# Patient Record
(demographics unavailable — no encounter records)

---

## 2025-07-08 NOTE — PHYSICAL EXAM
[de-identified] : Constitutional:  72 year old female, alert and oriented, cooperative, in no acute distress.  HEENT  NC/AT.  Appearance: symmetric  Neck/Back Straight without deformity or instability.  Good ROM.  Chest/Respiratory  Respiratory effort: no intercostal retractions or use of accessory muscles. Nonlabored Breathing  Skin  On inspection, warm and dry without rashes or lesions.  Mental Status:  Judgment, insight: intact Orientation: oriented to time, place, and person  Neurological: Sensory and Motor are grossly intact throughout  Right Knee  Inspection:     Skin intact, no rashes or lesions     No Effusion     Non-tender to palpation over tibial tubercle, patella, medial and lateral joint line, and pes insertion.  Range of Motion: 	Extension - 0 degrees 	Flexion - 120 degrees 	Extensor lag: None  Stability:      Demonstrates no Varus or Valgus instability      Negative Anterior or Posterior drawer.      Negative Lachman's  Patella: stable, tracks well.   Neurologic Exam     Motor intact including 5/5 Extensor Hallucis Longus, 5/5 Flexor Hallucis Longus, 5/5 Tibialis Anterior and 5/5 Gastrocnemius     Sensation Intact to Light Touch including Saphenous, Sural, Superficial Peroneal, Deep Peroneal, Tibial nerve distributions  Vascular Exam     Foot is warm and well perfused with 2+ Dorsalis Pedis Pulse   No pain with range of motion of the bilateral hips or left knee. No lumbar paraspinal muscle tenderness.  [de-identified] : XRay: XRays of the Right Knee (4 Views) taken in the office today and reviewed with the patient. XRays demonstrate tricompartmental joint space narrowing, with bone on bone articulations in the medial compartment, with subchondral sclerosis, overlying osteophytes, all consistent with severe osteoarthritis, KL Grade: 4. (my personal interpretation)

## 2025-07-08 NOTE — PHYSICAL EXAM
[de-identified] : Constitutional:  72 year old female, alert and oriented, cooperative, in no acute distress.  HEENT  NC/AT.  Appearance: symmetric  Neck/Back Straight without deformity or instability.  Good ROM.  Chest/Respiratory  Respiratory effort: no intercostal retractions or use of accessory muscles. Nonlabored Breathing  Skin  On inspection, warm and dry without rashes or lesions.  Mental Status:  Judgment, insight: intact Orientation: oriented to time, place, and person  Neurological: Sensory and Motor are grossly intact throughout  Right Knee  Inspection:     Skin intact, no rashes or lesions     No Effusion     Non-tender to palpation over tibial tubercle, patella, medial and lateral joint line, and pes insertion.  Range of Motion: 	Extension - 0 degrees 	Flexion - 120 degrees 	Extensor lag: None  Stability:      Demonstrates no Varus or Valgus instability      Negative Anterior or Posterior drawer.      Negative Lachman's  Patella: stable, tracks well.   Neurologic Exam     Motor intact including 5/5 Extensor Hallucis Longus, 5/5 Flexor Hallucis Longus, 5/5 Tibialis Anterior and 5/5 Gastrocnemius     Sensation Intact to Light Touch including Saphenous, Sural, Superficial Peroneal, Deep Peroneal, Tibial nerve distributions  Vascular Exam     Foot is warm and well perfused with 2+ Dorsalis Pedis Pulse   No pain with range of motion of the bilateral hips or left knee. No lumbar paraspinal muscle tenderness.  [de-identified] : XRay: XRays of the Right Knee (4 Views) taken in the office today and reviewed with the patient. XRays demonstrate tricompartmental joint space narrowing, with bone on bone articulations in the medial compartment, with subchondral sclerosis, overlying osteophytes, all consistent with severe osteoarthritis, KL Grade: 4. (my personal interpretation)

## 2025-07-08 NOTE — DISCUSSION/SUMMARY
[de-identified] : Sandy Pearce is a 72-year-old female who presents to the office for evaluation of her right knee pain.  X-rays showed severe right knee osteoarthritis.  Examination showed good right knee range of motion. Discussed with patient the examination and imaging findings.  Discussed with patient the operative and nonoperative management of knee osteoarthritis, including total knee arthroplasty.  Discussed the nonoperative management of knee osteoarthritis, including physical therapy, anti-inflammatories, and injections.  Patient has tried nonoperative management, but continues to have knee pain.  Discussed total knee arthroplasty at length, including surgical procedure, hospital course, DVT prophylaxis, antibiotics, physical therapy, recovery, and risks. Patient would like to proceed with total knee arthroplasty.  Discussed that patient will require medical clearance prior to surgery.  Discussed obtaining a CT scan prior to surgery.  Patient understanding and in agreement with the plan.  All questions answered.   Discussed the imaging and physical exam findings with the patient consistent with endstage knee degenerative disease. The patient has failed conservative management including physical therapy, pain control and injections. The risks, benefits and alternatives to total knee replacement were discussed with the patient in detail and the patient elected to proceed with surgery. Discussed the surgical plan with the patient including implant options and surgical approach.   Surgical risks including fracture requiring fixation, instability or dislocation, temporary or permanent leg length inequality, infection, bleeding, stiffness, failure to alleviate pain, failure to achieve desired results, need for further surgery, scar tissue formation, hardware failure, chronic pain, injury to nerves resulting in extremity dysfunction, injury to arteries and veins, deep vein thrombosis or pulmonary embolism requiring anticoagulation and medical risk factors including heart attack, stroke, death, neurological injury, pneumonia, kidney or other organ failure were discussed with the patient.   Patient was understanding and in agreement with the treatment plan. All questions answered.  Plan:   Surgical Plan: Diagnosis: Right Knee Osteoarthritis Laterality: Right Operative procedure: Right Total Knee Arthroplasty Location: [facility]  DVT prophylaxis: Eliquis 5mg BID TXA: IV Plan for discharge: Home  Pre- & Post Operative Antibiotics: Cefazolin 2g  Clearances:      Medical: Pending      Cardiac: Pending  Comorbidities:      Metal Allergy: Negative      Chronic Pain: Negative      Diabetes: Negative, Last A1C: 6.2      Use of Anticoagulation: Eliquis 5mg BID      Atrial Fibrillation: Positive s/p Ablation      History of VTE: Negative      History of Cardiac Stents: Negative      Skin Infections/Open Wounds: Negative      MRSA Infection/Colonization: Negative      Current Urinary Symptoms: Negative      Immunocompromise: Negative      Inflammatory Arthritis: Negative      Smoking: Negative      Drug Use: Negative      Alcohol Use: Negative      Obstructive Sleep Apnea: Negative      Neurologic Disease: Negative

## 2025-07-08 NOTE — HISTORY OF PRESENT ILLNESS
[de-identified] : Sandy Pearce is a 72-year-old female who presents to the office for evaluation of her right knee pain.  Patient has been experiencing right knee pain for several years.  Pain is worse at night and with walking.  She can walk with a limp.  Pain is located throughout the knee.  She has tried right knee viscosupplementation injections twice.  She has tried physical therapy and home exercises.  Patient has tried acupuncture.  She has tried Tylenol.  Pain is now affecting her quality of life and has decreased her ambulation.  History: Pre-Diabetes (Last A1C: 6.2), A-Fib (Eliquis 5mg BID) s/p Ablation, Lap Band, HTN, HLD

## 2025-07-08 NOTE — HISTORY OF PRESENT ILLNESS
[de-identified] : Sandy Pearce is a 72-year-old female who presents to the office for evaluation of her right knee pain.  Patient has been experiencing right knee pain for several years.  Pain is worse at night and with walking.  She can walk with a limp.  Pain is located throughout the knee.  She has tried right knee viscosupplementation injections twice.  She has tried physical therapy and home exercises.  Patient has tried acupuncture.  She has tried Tylenol.  Pain is now affecting her quality of life and has decreased her ambulation.  History: Pre-Diabetes (Last A1C: 6.2), A-Fib (Eliquis 5mg BID) s/p Ablation, Lap Band, HTN, HLD

## 2025-07-08 NOTE — DISCUSSION/SUMMARY
[de-identified] : Sandy Pearce is a 72-year-old female who presents to the office for evaluation of her right knee pain.  X-rays showed severe right knee osteoarthritis.  Examination showed good right knee range of motion. Discussed with patient the examination and imaging findings.  Discussed with patient the operative and nonoperative management of knee osteoarthritis, including total knee arthroplasty.  Discussed the nonoperative management of knee osteoarthritis, including physical therapy, anti-inflammatories, and injections.  Patient has tried nonoperative management, but continues to have knee pain.  Discussed total knee arthroplasty at length, including surgical procedure, hospital course, DVT prophylaxis, antibiotics, physical therapy, recovery, and risks. Patient would like to proceed with total knee arthroplasty.  Discussed that patient will require medical clearance prior to surgery.  Discussed obtaining a CT scan prior to surgery.  Patient understanding and in agreement with the plan.  All questions answered.   Discussed the imaging and physical exam findings with the patient consistent with endstage knee degenerative disease. The patient has failed conservative management including physical therapy, pain control and injections. The risks, benefits and alternatives to total knee replacement were discussed with the patient in detail and the patient elected to proceed with surgery. Discussed the surgical plan with the patient including implant options and surgical approach.   Surgical risks including fracture requiring fixation, instability or dislocation, temporary or permanent leg length inequality, infection, bleeding, stiffness, failure to alleviate pain, failure to achieve desired results, need for further surgery, scar tissue formation, hardware failure, chronic pain, injury to nerves resulting in extremity dysfunction, injury to arteries and veins, deep vein thrombosis or pulmonary embolism requiring anticoagulation and medical risk factors including heart attack, stroke, death, neurological injury, pneumonia, kidney or other organ failure were discussed with the patient.   Patient was understanding and in agreement with the treatment plan. All questions answered.  Plan:   Surgical Plan: Diagnosis: Right Knee Osteoarthritis Laterality: Right Operative procedure: Right Total Knee Arthroplasty Location: [facility]  DVT prophylaxis: Eliquis 5mg BID TXA: IV Plan for discharge: Home  Pre- & Post Operative Antibiotics: Cefazolin 2g  Clearances:      Medical: Pending      Cardiac: Pending  Comorbidities:      Metal Allergy: Negative      Chronic Pain: Negative      Diabetes: Negative, Last A1C: 6.2      Use of Anticoagulation: Eliquis 5mg BID      Atrial Fibrillation: Positive s/p Ablation      History of VTE: Negative      History of Cardiac Stents: Negative      Skin Infections/Open Wounds: Negative      MRSA Infection/Colonization: Negative      Current Urinary Symptoms: Negative      Immunocompromise: Negative      Inflammatory Arthritis: Negative      Smoking: Negative      Drug Use: Negative      Alcohol Use: Negative      Obstructive Sleep Apnea: Negative      Neurologic Disease: Negative